# Patient Record
Sex: MALE | Race: WHITE | NOT HISPANIC OR LATINO | Employment: FULL TIME | URBAN - METROPOLITAN AREA
[De-identification: names, ages, dates, MRNs, and addresses within clinical notes are randomized per-mention and may not be internally consistent; named-entity substitution may affect disease eponyms.]

---

## 2018-04-02 ENCOUNTER — ANESTHESIA EVENT (OUTPATIENT)
Dept: GASTROENTEROLOGY | Facility: AMBULARY SURGERY CENTER | Age: 58
End: 2018-04-02
Payer: COMMERCIAL

## 2018-04-02 RX ORDER — ASCORBIC ACID 500 MG
500 TABLET ORAL DAILY
COMMUNITY

## 2018-04-02 RX ORDER — INDOMETHACIN 25 MG/1
25 CAPSULE ORAL 2 TIMES DAILY PRN
COMMUNITY

## 2018-04-02 RX ORDER — ALLOPURINOL 100 MG/1
200 TABLET ORAL EVERY MORNING
COMMUNITY

## 2018-04-02 RX ORDER — LISINOPRIL 20 MG/1
20 TABLET ORAL 2 TIMES DAILY
COMMUNITY

## 2018-04-02 RX ORDER — DIPHENOXYLATE HYDROCHLORIDE AND ATROPINE SULFATE 2.5; .025 MG/1; MG/1
1 TABLET ORAL DAILY
COMMUNITY

## 2018-04-02 RX ORDER — POTASSIUM CHLORIDE 1.5 G/1.77G
20 POWDER, FOR SOLUTION ORAL 2 TIMES DAILY
COMMUNITY

## 2018-04-02 RX ORDER — METAXALONE 800 MG/1
800 TABLET ORAL 3 TIMES DAILY
Status: ON HOLD | COMMUNITY
End: 2018-04-03

## 2018-04-02 NOTE — PRE-PROCEDURE INSTRUCTIONS
Pre-Surgery Instructions:   Medication Instructions    allopurinol (ZYLOPRIM) 100 mg tablet Patient was instructed by Physician and understands   ascorbic acid (VITAMIN C) 500 mg tablet Patient was instructed by Physician and understands   indomethacin (INDOCIN) 25 mg capsule Patient was instructed by Physician and understands   lisinopril (ZESTRIL) 20 mg tablet Patient was instructed by Physician and understands   Lysine 500 MG CAPS Patient was instructed by Physician and understands   metaxalone (SKELAXIN) 800 mg tablet Patient was instructed by Physician and understands   multivitamin [facility]) TABS Patient was instructed by Physician and understands   potassium chloride (KLOR-CON) 20 mEq packet Patient was instructed by Physician and understands   vitamin E 100 UNIT capsule Patient was instructed by Physician and understands

## 2018-04-03 ENCOUNTER — HOSPITAL ENCOUNTER (OUTPATIENT)
Facility: AMBULARY SURGERY CENTER | Age: 58
Setting detail: OUTPATIENT SURGERY
Discharge: HOME | End: 2018-04-03
Attending: INTERNAL MEDICINE | Admitting: INTERNAL MEDICINE
Payer: COMMERCIAL

## 2018-04-03 ENCOUNTER — ANESTHESIA (OUTPATIENT)
Dept: GASTROENTEROLOGY | Facility: AMBULARY SURGERY CENTER | Age: 58
End: 2018-04-03
Payer: COMMERCIAL

## 2018-04-03 VITALS
HEIGHT: 74 IN | SYSTOLIC BLOOD PRESSURE: 114 MMHG | TEMPERATURE: 98.5 F | OXYGEN SATURATION: 99 % | RESPIRATION RATE: 18 BRPM | DIASTOLIC BLOOD PRESSURE: 55 MMHG | BODY MASS INDEX: 37.73 KG/M2 | HEART RATE: 55 BPM | WEIGHT: 294 LBS

## 2018-04-03 DIAGNOSIS — Z86.010 HISTORY OF COLONIC POLYPS: ICD-10-CM

## 2018-04-03 PROCEDURE — 88305 TISSUE EXAM BY PATHOLOGIST: CPT | Performed by: PATHOLOGY

## 2018-04-03 RX ORDER — PROPOFOL 10 MG/ML
INJECTION, EMULSION INTRAVENOUS CONTINUOUS PRN
Status: DISCONTINUED | OUTPATIENT
Start: 2018-04-03 | End: 2018-04-03 | Stop reason: SURG

## 2018-04-03 RX ORDER — SODIUM CHLORIDE 9 MG/ML
125 INJECTION, SOLUTION INTRAVENOUS CONTINUOUS
Status: DISCONTINUED | OUTPATIENT
Start: 2018-04-03 | End: 2018-04-03 | Stop reason: HOSPADM

## 2018-04-03 RX ORDER — PROPOFOL 10 MG/ML
INJECTION, EMULSION INTRAVENOUS AS NEEDED
Status: DISCONTINUED | OUTPATIENT
Start: 2018-04-03 | End: 2018-04-03 | Stop reason: SURG

## 2018-04-03 RX ADMIN — SODIUM CHLORIDE: 0.9 INJECTION, SOLUTION INTRAVENOUS at 09:57

## 2018-04-03 RX ADMIN — PROPOFOL 80 MG: 10 INJECTION, EMULSION INTRAVENOUS at 10:09

## 2018-04-03 RX ADMIN — PROPOFOL 100 MCG/KG/MIN: 10 INJECTION, EMULSION INTRAVENOUS at 10:09

## 2018-04-03 RX ADMIN — PROPOFOL 50 MG: 10 INJECTION, EMULSION INTRAVENOUS at 10:10

## 2018-04-03 RX ADMIN — SODIUM CHLORIDE 125 ML/HR: 0.9 INJECTION, SOLUTION INTRAVENOUS at 08:43

## 2018-04-03 NOTE — DISCHARGE INSTRUCTIONS
I have removed four polyps  They all appear benign  Next colonoscopy in 3-5 years  Eat high-fiber diet  High Fiber Diet   WHAT YOU NEED TO KNOW:   A high-fiber diet includes foods that have a high amount of fiber  Fiber is the part of fruits, vegetables, and grains that is not broken down by your body  Fiber keeps your bowel movements regular  Fiber can also help lower your cholesterol level, control blood sugar in people with diabetes, and relieve constipation  Fiber can also help you control your weight because it helps you feel full faster  Most adults should eat 25 to 35 grams of fiber each day  Talk to your dietitian or healthcare provider about the amount of fiber you need  DISCHARGE INSTRUCTIONS:   Good sources of fiber:   · Foods with at least 4 grams of fiber per serving:      ¨ ? to ½ cup of high-fiber cereal (check the nutrition label on the box)    ¨ ½ cup of blackberries or raspberries    ¨ 4 dried prunes    ¨ 1 cooked artichoke    ¨ ½ cup of cooked legumes, such as lentils, or red, kidney, and chan beans    · Foods with 1 to 3 grams of fiber per serving:      ¨ 1 slice of whole-wheat, pumpernickel, or rye bread    ¨ ½ cup of cooked brown rice    ¨ 4 whole-wheat crackers    ¨ 1 cup of oatmeal    ¨ ½ cup of cereal with 1 to 3 grams of fiber per serving (check the nutrition label on the box)    ¨ 1 small piece of fruit, such as an apple, banana, pear, kiwi, or orange    ¨ 3 dates    ¨ ½ cup of canned apricots, fruit cocktail, peaches, or pears    ¨ ½ cup of raw or cooked vegetables, such as carrots, cauliflower, cabbage, spinach, squash, or corn  Ways that you can increase fiber in your diet:   · Choose brown or wild rice instead of white rice  · Use whole wheat flour in recipes instead of white or all-purpose flour  · Add beans and peas to casseroles or soups  · Choose fresh fruit and vegetables with peels or skins on instead of juices    Other diet guidelines to follow:   · Add fiber to your diet slowly  You may have abdominal discomfort, bloating, and gas if you add fiber to your diet too quickly  · Drink plenty of liquids as you add fiber to your diet  You may have nausea or develop constipation if you do not drink enough water  Ask how much liquid to drink each day and which liquids are best for you  © 2017 [employer] [employer] Information is for End User's use only and may not be sold, redistributed or otherwise used for commercial purposes  All illustrations and images included in CareNotes® are the copyrighted property of [employer]  or [employer] Peña  The above information is an  only  It is not intended as medical advice for individual conditions or treatments  Talk to your doctor, nurse or pharmacist before following any medical regimen to see if it is safe and effective for you  Moderate Sedation   WHAT YOU NEED TO KNOW:   Moderate sedation, or conscious sedation, is medicine used during procedures to help you feel relaxed and calm  You will be awake and able to follow directions without anxiety or pain  You will remember little to none of the procedure  You may feel tired, weak, or unsteady on your feet after you get sedation  You may also have trouble concentrating or short-term memory loss  These symptoms should go away in 24 hours or less  DISCHARGE INSTRUCTIONS:   Call 911 or have someone else call for any of the following:   · You have sudden trouble breathing  · You cannot be woken  Seek care immediately if:   · You have a severe headache or dizziness  · Your heart is beating faster than usual   Contact your healthcare provider if:   · You have a fever  · You have nausea or are vomiting for more than 8 hours after the procedure  · Your skin is itchy, swollen, or you have a rash  · You have questions or concerns about your condition or care  Self-care:   · Have someone stay with you for 24 hours    This person can drive you to errands and help you do things around the house  This person can also watch for problems  · Rest and do quiet activities for 24 hours  Do not exercise, ride a bike, or play sports  Stand up slowly to prevent dizziness and falls  Take short walks around the house with another person  Slowly return to your usual activities the next day  · Do not drive or use dangerous machines or tools for 24 hours  You may injure yourself or others  Examples include a lawnmower, saw, or drill  Do not return to work for 24 hours if you use dangerous machines or tools for work  · Do not make important decisions for 24 hours  For example, do not sign important papers or invest money  · Drink liquids as directed  Liquids help flush the sedation medicine out of your body  Ask how much liquid to drink each day and which liquids are best for you  · Eat small, frequent meals to prevent nausea and vomiting  Start with clear liquids such as juice or broth  If you do not vomit after clear liquids, you can eat your usual foods  · Do not drink alcohol or take medicines that make you drowsy  This includes medicines that help you sleep and anxiety medicines  Ask your healthcare provider if it is safe for you to take pain medicine  Follow up with your healthcare provider as directed:  Write down your questions so you remember to ask them during your visits  © 2017 [employer] Information is for End User's use only and may not be sold, redistributed or otherwise used for commercial purposes  All illustrations and images included in CareNotes® are the copyrighted property of [employer]  or [employer] Peña  The above information is an  only  It is not intended as medical advice for individual conditions or treatments  Talk to your doctor, nurse or pharmacist before following any medical regimen to see if it is safe and effective for you

## 2018-04-03 NOTE — ANESTHESIA PREPROCEDURE EVALUATION
Review of Systems/Medical History          Cardiovascular  Hypertension ,    Pulmonary       GI/Hepatic    Bowel prep       Kidney stones,        Endo/Other    Obesity (BMI 40)    GYN       Hematology   Musculoskeletal  Gout,        Neurology   Psychology                Anesthesia Plan  ASA Score- 3     Anesthesia Type- IV sedation with anesthesia with ASA Monitors  Additional Monitors:   Airway Plan:         Plan Factors- Instructed to abstain from smoking on day of procedure: nonsmoker    Patient smoked on day of surgery: nonsmoker       Induction- intravenous  Postoperative Plan-     Informed Consent- Anesthetic plan and risks discussed with patient

## 2018-04-03 NOTE — H&P
History and Physical   Gastroenterology  Andrea Jeffries 65 y o  male MRN: [id number]  Unit/Bed#: [facility] Encounter: [id number]  04/03/18   9:52 AM      No chief complaint on file  History of Present Illness   HPI:  Andrea Jeffries is a 65 y o  male who presents with history of colon polyp  Last colonoscopy three years ago  Family history of cancers  Patient denies any abdominal pain or discomfort  There is no nausea or vomiting        Historical Information   Past Medical History:   Diagnosis Date    Diverticulosis     Gout     H/O cold sores     Hypertension     Kidney stone      Past Surgical History:   Procedure Laterality Date    COLONOSCOPY      LITHOTRIPSY         Meds/Allergies     Prescriptions Prior to Admission   Medication    allopurinol (ZYLOPRIM) 100 mg tablet    ascorbic acid (VITAMIN C) 500 mg tablet    lisinopril (ZESTRIL) 20 mg tablet    multivitamin (THERAGRAN) TABS    potassium chloride (KLOR-CON) 20 mEq packet    vitamin E 100 UNIT capsule    indomethacin (INDOCIN) 25 mg capsule    Lysine 500 MG CAPS         Current Facility-Administered Medications:     sodium chloride 0 9 % infusion, 125 mL/hr, Intravenous, Continuous, Esther Douglas MD, Last Rate: 125 mL/hr at 04/03/18 0843, 125 mL/hr at 04/03/18 0843    No Known Allergies    Social History   History   Alcohol Use    Yes     Comment: socially     History   Drug Use No     History   Smoking Status    Never Smoker   Smokeless Tobacco    Never Used       Family History:   Family History   Problem Relation Age of Onset    Cancer Mother     No Known Problems Father     No Known Problems Brother     No Known Problems Daughter     No Known Problems Son     No Known Problems Brother     No Known Problems Brother     No Known Problems Daughter     No Known Problems Daughter        Objective     Current Vitals:   Blood Pressure: 142/89 (04/03/18 0836)  Pulse: 74 (04/03/18 0836)  Temperature: 98 5 °F (36 9 °C) (04/03/18 0836)  Temp Source: Tympanic (04/03/18 0836)  Respirations: 18 (04/03/18 0836)  Height: 6' 2" (188 cm) (04/02/18 0827)  Weight - Scale: 133 kg (294 lb) (04/02/18 0827)  SpO2: 95 % (04/03/18 0836)  No intake or output data in the 24 hours ending 04/03/18 0952    Physical Exam:  General:  Alert and oriented  Eyes:  There is no pallor or jaundice  Pulm:  Clear to auscultation and percussion  CV:  There is normal S1 and S2  There is no murmur or gallop  Abdomen:  Soft and nontender  There is no mass felt  Liver is felt  Liver is not enlarged  Extremities:      ASSESSMENT:  Joycelyn Holder is a 65 y o  male who presents with history of colon polyps and family history of cancers  Rosella Goldmann PLAN:  Colonoscopy for screening and surveillance        Napoleon Velasquez MD

## 2018-04-03 NOTE — OP NOTE
Procedure: Colonoscopy snare polypectomy and biopsy  Indications: History of colon polyp  Last colonoscopy three years ago  Providers: Quinten Tijerina    Referring MD: No ref  provider found   Medications:  See chart      Consent: Patient was explained the procedure of colonoscopy  Indication of the procedure and associated complications including bleeding, perforation and medication side effect was explained to the patient  Less than 100% sensitivity was also explained  Patient admitted understanding above  Vital signs stable  HEENT examination is unremarkable  Chest is clear bilaterally  Cardiovascular system examination reveals normal S1 and S2  There is no murmur or  Gallop  Abdomen is soft  There is no mass or guarding  Abdomen is nontender  Preprocedure diagnosis:  History of adenomatous colon polyp    Patient was put on left lateral position  After IV anesthesia was administered the procedure was started with insertion of the scope into the rectum  The scope was gradually progressed via sigmoid colon, descending colon, splenic flexure, transverse colon, hepatic flexure, ascending colon into the cecum  All the landmarks were ascertained  Scope was then gradually pulled out and entire colon was examined appropriately  Polyp removed from the cecum using a biopsy forceps  A small sessile polyp was removed from the distal ascending colon using a cold snare  Two small polyp was also removed from the mid sigmoid colon using a biopsy forceps  Postprocedure diagnosis:  Colon polyp    Prep was adequate  Biopsies taken:  X3    Blood loss:  Minimal      Findings    1  Colon polyp removed from the cecum, ascending colon and sigmoid colon  2   Small hemorrhoid  Impression/Recommendations;    1  Check biopsy  2   Repeat colonoscopy in 3-5 years            Quinten Tijerina MD

## 2021-06-06 ENCOUNTER — APPOINTMENT (EMERGENCY)
Dept: CT IMAGING | Facility: HOSPITAL | Age: 61
End: 2021-06-06
Payer: COMMERCIAL

## 2021-06-06 ENCOUNTER — HOSPITAL ENCOUNTER (EMERGENCY)
Facility: HOSPITAL | Age: 61
Discharge: HOME | End: 2021-06-07
Attending: EMERGENCY MEDICINE | Admitting: EMERGENCY MEDICINE
Payer: COMMERCIAL

## 2021-06-06 DIAGNOSIS — N39.0 UTI (URINARY TRACT INFECTION): Primary | ICD-10-CM

## 2021-06-06 LAB
BASOPHILS # BLD AUTO: 0.03 THOUSANDS/ΜL (ref 0–0.1)
BASOPHILS NFR BLD AUTO: 0 % (ref 0–1)
BILIRUB UR QL STRIP: NEGATIVE
CLARITY UR: ABNORMAL
COLOR UR: ABNORMAL
EOSINOPHIL # BLD AUTO: 0.16 THOUSAND/ΜL (ref 0–0.61)
EOSINOPHIL NFR BLD AUTO: 2 % (ref 0–6)
ERYTHROCYTE [DISTWIDTH] IN BLOOD BY AUTOMATED COUNT: 13.3 % (ref 11.6–15.1)
GLUCOSE UR STRIP-MCNC: NEGATIVE MG/DL
HCT VFR BLD AUTO: 45.1 % (ref 36.5–49.3)
HGB BLD-MCNC: 14.8 G/DL (ref 12–17)
HGB UR QL STRIP.AUTO: ABNORMAL
IMM GRANULOCYTES # BLD AUTO: 0.02 THOUSAND/UL (ref 0–0.2)
IMM GRANULOCYTES NFR BLD AUTO: 0 % (ref 0–2)
KETONES UR STRIP-MCNC: ABNORMAL MG/DL
LEUKOCYTE ESTERASE UR QL STRIP: ABNORMAL
LYMPHOCYTES # BLD AUTO: 2.52 THOUSANDS/ΜL (ref 0.6–4.47)
LYMPHOCYTES NFR BLD AUTO: 30 % (ref 14–44)
MCH RBC QN AUTO: 28.1 PG (ref 26.8–34.3)
MCHC RBC AUTO-ENTMCNC: 32.8 G/DL (ref 31.4–37.4)
MCV RBC AUTO: 86 FL (ref 82–98)
MONOCYTES # BLD AUTO: 1.01 THOUSAND/ΜL (ref 0.17–1.22)
MONOCYTES NFR BLD AUTO: 12 % (ref 4–12)
NEUTROPHILS # BLD AUTO: 4.56 THOUSANDS/ΜL (ref 1.85–7.62)
NEUTS SEG NFR BLD AUTO: 56 % (ref 43–75)
NITRITE UR QL STRIP: POSITIVE
PH UR STRIP.AUTO: 7 [PH]
PLATELET # BLD AUTO: 220 THOUSANDS/UL (ref 149–390)
PMV BLD AUTO: 10.7 FL (ref 8.9–12.7)
PROT UR STRIP-MCNC: ABNORMAL MG/DL
RBC # BLD AUTO: 5.26 MILLION/UL (ref 3.88–5.62)
SP GR UR STRIP.AUTO: 1.02 (ref 1–1.03)
UROBILINOGEN UR QL STRIP.AUTO: 1 E.U./DL
WBC # BLD AUTO: 8.3 THOUSAND/UL (ref 4.31–10.16)

## 2021-06-06 PROCEDURE — 83690 ASSAY OF LIPASE: CPT | Performed by: EMERGENCY MEDICINE

## 2021-06-06 PROCEDURE — 80053 COMPREHEN METABOLIC PANEL: CPT | Performed by: EMERGENCY MEDICINE

## 2021-06-06 PROCEDURE — 36415 COLL VENOUS BLD VENIPUNCTURE: CPT | Performed by: EMERGENCY MEDICINE

## 2021-06-06 PROCEDURE — 99284 EMERGENCY DEPT VISIT MOD MDM: CPT

## 2021-06-06 PROCEDURE — 99285 EMERGENCY DEPT VISIT HI MDM: CPT | Performed by: EMERGENCY MEDICINE

## 2021-06-06 PROCEDURE — 96374 THER/PROPH/DIAG INJ IV PUSH: CPT

## 2021-06-06 PROCEDURE — 81001 URINALYSIS AUTO W/SCOPE: CPT | Performed by: EMERGENCY MEDICINE

## 2021-06-06 PROCEDURE — 85025 COMPLETE CBC W/AUTO DIFF WBC: CPT | Performed by: EMERGENCY MEDICINE

## 2021-06-06 PROCEDURE — 74176 CT ABD & PELVIS W/O CONTRAST: CPT

## 2021-06-06 RX ORDER — MORPHINE SULFATE 4 MG/ML
4 INJECTION, SOLUTION INTRAMUSCULAR; INTRAVENOUS ONCE
Status: COMPLETED | OUTPATIENT
Start: 2021-06-06 | End: 2021-06-06

## 2021-06-06 RX ORDER — METOPROLOL SUCCINATE 25 MG/1
25 TABLET, EXTENDED RELEASE ORAL DAILY
COMMUNITY

## 2021-06-06 RX ADMIN — MORPHINE SULFATE 4 MG: 4 INJECTION INTRAVENOUS at 23:33

## 2021-06-07 VITALS
TEMPERATURE: 98.2 F | HEART RATE: 65 BPM | HEIGHT: 75 IN | RESPIRATION RATE: 16 BRPM | DIASTOLIC BLOOD PRESSURE: 58 MMHG | WEIGHT: 315 LBS | BODY MASS INDEX: 39.17 KG/M2 | OXYGEN SATURATION: 96 % | SYSTOLIC BLOOD PRESSURE: 119 MMHG

## 2021-06-07 LAB
ALBUMIN SERPL BCP-MCNC: 4.3 G/DL (ref 3.4–4.8)
ALP SERPL-CCNC: 62.9 U/L (ref 10–129)
ALT SERPL W P-5'-P-CCNC: 26 U/L (ref 5–63)
ANION GAP SERPL CALCULATED.3IONS-SCNC: 8 MMOL/L (ref 4–13)
AST SERPL W P-5'-P-CCNC: 20 U/L (ref 15–41)
BACTERIA UR QL AUTO: ABNORMAL /HPF
BILIRUB SERPL-MCNC: 0.59 MG/DL (ref 0.3–1.2)
BUN SERPL-MCNC: 23 MG/DL (ref 6–20)
CALCIUM SERPL-MCNC: 9.3 MG/DL (ref 8.4–10.2)
CHLORIDE SERPL-SCNC: 105 MMOL/L (ref 96–108)
CO2 SERPL-SCNC: 29 MMOL/L (ref 22–33)
CREAT SERPL-MCNC: 1.1 MG/DL (ref 0.5–1.2)
GFR SERPL CREATININE-BSD FRML MDRD: 73 ML/MIN/1.73SQ M
GLUCOSE SERPL-MCNC: 105 MG/DL (ref 65–140)
LIPASE SERPL-CCNC: 20 U/L (ref 13–60)
NON-SQ EPI CELLS URNS QL MICRO: ABNORMAL /HPF
POTASSIUM SERPL-SCNC: 4.2 MMOL/L (ref 3.5–5)
PROT SERPL-MCNC: 6.9 G/DL (ref 6.4–8.3)
RBC #/AREA URNS AUTO: ABNORMAL /HPF
SODIUM SERPL-SCNC: 142 MMOL/L (ref 133–145)
WBC #/AREA URNS AUTO: ABNORMAL /HPF

## 2021-06-07 PROCEDURE — 96361 HYDRATE IV INFUSION ADD-ON: CPT

## 2021-06-07 RX ORDER — SULFAMETHOXAZOLE AND TRIMETHOPRIM 800; 160 MG/1; MG/1
1 TABLET ORAL 2 TIMES DAILY
Qty: 14 TABLET | Refills: 0 | Status: SHIPPED | OUTPATIENT
Start: 2021-06-07 | End: 2021-06-07 | Stop reason: SDUPTHER

## 2021-06-07 RX ORDER — SULFAMETHOXAZOLE AND TRIMETHOPRIM 800; 160 MG/1; MG/1
1 TABLET ORAL ONCE
Status: COMPLETED | OUTPATIENT
Start: 2021-06-07 | End: 2021-06-07

## 2021-06-07 RX ORDER — SULFAMETHOXAZOLE AND TRIMETHOPRIM 800; 160 MG/1; MG/1
1 TABLET ORAL 2 TIMES DAILY
Qty: 14 TABLET | Refills: 0 | Status: SHIPPED | OUTPATIENT
Start: 2021-06-07 | End: 2021-06-14

## 2021-06-07 RX ADMIN — SULFAMETHOXAZOLE AND TRIMETHOPRIM 1 TABLET: 800; 160 TABLET ORAL at 01:47

## 2021-06-07 RX ADMIN — SODIUM CHLORIDE 1000 ML: 0.9 INJECTION, SOLUTION INTRAVENOUS at 00:57

## 2021-06-07 NOTE — ED TRIAGE NOTES
Pt presents to the ED from home with complaint of right flank pain which radiates into right groin which started at 1600 today; history of kidney stones of unknown laterality; denies nausea, vomiting, fever and/or diarrhea; arrives ambulatory able to answer all questions appropriately

## 2021-06-07 NOTE — ED PROVIDER NOTES
History  Chief Complaint   Patient presents with    Flank Pain     Pt presents to the ED from home with complaint of right flank pain which radiates into right groin which started at 1600 today; history of kidney stones of unknown laterality; denies nausea, vomiting, fever and/or diarrhea; arrives ambulatory able to answer all questions appropriately     This is a 72-year-old male presents the emergency department complaining of right flank pain  Patient states pain began approximately 4:00 a m  This afternoon  Patient states the pain radiates from the right flank down to the  lower abdomen  He states nothing makes the pain better or worse  He states the pain Is mild an annoying  He denies fevers or chills  He denies chest pain or trouble breathing  He does have urgency and frequency  He states this feels like his prior kidney stone  Prior to Admission Medications   Prescriptions Last Dose Informant Patient Reported? Taking?    Lysine 500 MG CAPS Not Taking at Unknown time  Yes No   Sig: Take by mouth   allopurinol (ZYLOPRIM) 100 mg tablet More than a month at Unknown time  Yes No   Sig: Take 200 mg by mouth every morning   ascorbic acid (VITAMIN C) 500 mg tablet 6/6/2021 at Unknown time  Yes Yes   Sig: Take 500 mg by mouth daily   indomethacin (INDOCIN) 25 mg capsule More than a month at Unknown time  Yes No   Sig: Take 25 mg by mouth 2 (two) times a day as needed for mild pain   lisinopril (ZESTRIL) 20 mg tablet 6/6/2021 at Unknown time  Yes Yes   Sig: Take 20 mg by mouth 2 (two) times a day   metoprolol succinate (TOPROL-XL) 25 mg 24 hr tablet 6/6/2021 at Unknown time  Yes Yes   Sig: Take 25 mg by mouth daily   multivitamin (THERAGRAN) TABS 6/6/2021 at Unknown time  Yes Yes   Sig: Take 1 tablet by mouth daily   potassium chloride (KLOR-CON) 20 mEq packet Not Taking at Unknown time  Yes No   Sig: Take 20 mEq by mouth 2 (two) times a day   vitamin E 100 UNIT capsule Not Taking at Unknown time  Yes No   Sig: Take 100 Units by mouth daily      Facility-Administered Medications: None       Past Medical History:   Diagnosis Date    Diverticulosis     Gout     H/O cold sores     Hypertension     Kidney stone        Past Surgical History:   Procedure Laterality Date    CARDIAC CATHETERIZATION  09/2018    COLONOSCOPY      CORONARY STENT PLACEMENT  09/2018    CORONARY STENT PLACEMENT      LITHOTRIPSY      [place] COLSC FLX W/RMVL OF TUMOR POLYP LESION SNARE TQ N/A 4/3/2018    Procedure: COLONOSCOPY biopsy and snare polypectomy;  Surgeon: Bryan Tello MD;  Location: [facility] GI LAB; Service: Gastroenterology       Family History   Problem Relation Age of Onset    Cancer Mother     Skin cancer Mother     Melanoma Mother     Heart attack Father     Hypertension Father     No Known Problems Brother     No Known Problems Daughter     No Known Problems Son     No Known Problems Brother     No Known Problems Brother     No Known Problems Daughter     No Known Problems Daughter      I have reviewed and agree with the history as documented  E-Cigarette/Vaping     E-Cigarette/Vaping Substances     Social History     Tobacco Use    Smoking status: Current Every Day Smoker     Types: Cigars    Smokeless tobacco: Never Used    Tobacco comment: Former smoker x 5 years; quit 1993 - As per [place]    Substance Use Topics    Alcohol use: Yes     Comment: socially    Drug use: No       Review of Systems   All other systems reviewed and are negative        Physical Exam  Physical Exam  Constitutional:  Vital signs reviewed, patient appears nontoxic, no acute distress  Eyes: Pupils equal round reactive to light and accommodation, extraocular muscles intact  HEENT: trachea midline, no JVD, moist mucous membranes  Respiratory: lung sounds clear throughout, no rhonchi, no rales  Cardiovascular: regular rate rhythm, no murmurs or rubs  Abdomen: soft, suprapubic tenderness, nondistended, no rebound or guarding  Back:  Mild right-sided CVA tenderness, normal inspection  Extremities: no edema, pulses equal in all 4 extremities  Neuro: awake, alert, oriented, no focal weakness  Skin: warm, dry, intact, no rashes noted    Vital Signs  ED Triage Vitals [06/06/21 2253]   Temperature Pulse Respirations Blood Pressure SpO2   (!) 97 4 °F (36 3 °C) 71 16 139/91 97 %      Temp Source Heart Rate Source Patient Position - Orthostatic VS BP Location FiO2 (%)   Oral Monitor Sitting Left arm --      Pain Score       5           Vitals:    06/06/21 2253 06/07/21 0104   BP: 139/91 119/58   Pulse: 71 65   Patient Position - Orthostatic VS: Sitting Lying         Visual Acuity      ED Medications  Medications   morphine (PF) 4 mg/mL injection 4 mg (4 mg Intravenous Given 6/6/21 2333)   sodium chloride 0 9 % bolus 1,000 mL (0 mL Intravenous Stopped 6/7/21 0205)   sulfamethoxazole-trimethoprim (BACTRIM DS) 800-160 mg per tablet 1 tablet (1 tablet Oral Given 6/7/21 0147)       Diagnostic Studies  Results Reviewed     Procedure Component Value Units Date/Time    Urine Microscopic [[id number]]  (Abnormal) Collected: 06/06/21 2343    Lab Status: Final result Specimen: Urine, Clean Catch Updated: 06/07/21 0015     RBC, UA       Field obscured, unable to enumerate     /hpf     WBC, UA       Field obscured, unable to enumerate     /hpf     Epithelial Cells       Field obscured, unable to enumerate     /hpf     Bacteria, UA       Field obscured, unable to enumerate     /hpf    Narrative:      Due to the large amount of red cells in specimen no quantitation can be performed    SLW    CMP [[id number]]  (Abnormal) Collected: 06/06/21 2329    Lab Status: Final result Specimen: Blood from Arm, Left Updated: 06/07/21 0002     Sodium 142 mmol/L      Potassium 4 2 mmol/L      Chloride 105 mmol/L      CO2 29 mmol/L      ANION GAP 8 mmol/L      BUN 23 mg/dL      Creatinine 1 10 mg/dL      Glucose 105 mg/dL      Calcium 9 3 mg/dL      AST 20 U/L      ALT 26 U/L      Alkaline Phosphatase 62 9 U/L      Total Protein 6 9 g/dL      Albumin 4 3 g/dL      Total Bilirubin 0 59 mg/dL      eGFR 73 ml/min/1 73sq m     Narrative:      National Kidney Disease Foundation guidelines for Chronic Kidney Disease (CKD):     Stage 1 with normal or high GFR (GFR > 90 mL/min/1 73 square meters)    Stage 2 Mild CKD (GFR = 60-89 mL/min/1 73 square meters)    Stage 3A Moderate CKD (GFR = 45-59 mL/min/1 73 square meters)    Stage 3B Moderate CKD (GFR = 30-44 mL/min/1 73 square meters)    Stage 4 Severe CKD (GFR = 15-29 mL/min/1 73 square meters)    Stage 5 End Stage CKD (GFR <15 mL/min/1 73 square meters)  Note: GFR calculation is accurate only with a steady state creatinine    Lipase [[id number]]  (Normal) Collected: 06/06/21 2329    Lab Status: Final result Specimen: Blood from Arm, Left Updated: 06/07/21 0002     Lipase 20 u/L     UA (URINE) with reflex to Scope [[id number]]  (Abnormal) Collected: 06/06/21 2343    Lab Status: Final result Specimen: Urine, Clean Catch Updated: 06/06/21 2354     Color, UA Red     Clarity, UA Cloudy     Specific Gravity, UA 1 025     pH, UA 7 0     Leukocytes, UA 1+     Nitrite, UA Positive     Protein, UA 2+ mg/dl      Glucose, UA Negative mg/dl      Ketones, UA Trace mg/dl      Urobilinogen, UA 1 0 E U /dl      Bilirubin, UA Negative     Blood, UA 3+    CBC and differential [[id number]]  (Normal) Collected: 06/06/21 2329    Lab Status: Final result Specimen: Blood from Arm, Left Updated: 06/06/21 2344     WBC 8 30 Thousand/uL      RBC 5 26 Million/uL      Hemoglobin 14 8 g/dL      Hematocrit 45 1 %      MCV 86 fL      MCH 28 1 pg      MCHC 32 8 g/dL      RDW 13 3 %      MPV 10 7 fL      Platelets 898 Thousands/uL      Neutrophils Relative 56 %      Immat GRANS % 0 %      Lymphocytes Relative 30 %      Monocytes Relative 12 %      Eosinophils Relative 2 %      Basophils Relative 0 %      Neutrophils Absolute 4 56 Thousands/µL      Immature Grans Absolute 0 02 Thousand/uL      Lymphocytes Absolute 2 52 Thousands/µL      Monocytes Absolute 1 01 Thousand/µL      Eosinophils Absolute 0 16 Thousand/µL      Basophils Absolute 0 03 Thousands/µL                  CT renal stone study abdomen pelvis without contrast   Final Result by Carolyn Thorpe DO (06/07 0117)      No nephrolithiasis or hydronephrosis is seen  Partially distended bladder  Mild/moderate circumferential bladder wall thickening noted, probably exaggerated by underdistention  Superimposed cystitis is considered in the appropriate clinical setting  Correlation with the patient's symptoms,    laboratory values, and urinalysis recommended  Cholelithiasis without discrete evidence of acute cholecystitis, and other findings as above  Workstation performed: [id number]                    Procedures  Procedures         ED Course                             SBIRT 20yo+      Most Recent Value   SBIRT (23 yo +)   In order to provide better care to our patients, we are screening all of our patients for alcohol and drug use  Would it be okay to ask you these screening questions? No Filed at: 06/07/2021 0153   Initial Alcohol Screen: US AUDIT-C    1  How often do you have a drink containing alcohol? 4 Filed at: 06/07/2021 0153   2  How many drinks containing alcohol do you have on a typical day you are drinking? 0 Filed at: 06/07/2021 0153   3a  Male UNDER 65: How often do you have five or more drinks on one occasion? 0 Filed at: 06/07/2021 0153   3b  FEMALE Any Age, or MALE 65+: How often do you have 4 or more drinks on one occassion? 0 Filed at: 06/07/2021 0153   Audit-C Score  4 Filed at: 06/07/2021 0153   RICKY: How many times in the past year have you    Used an illegal drug or used a prescription medication for non-medical reasons?   Never Filed at: 06/07/2021 0153                    MDM  Number of Diagnoses or Management Options  UTI (urinary tract infection):   Diagnosis management comments: This is a 60-year-old male presented to the emergency department complaining of right-sided flank pain and lower abdominal discomfort  I considered nephrolithiasis, UTI, pyelonephritis  These and other diagnoses were considered  The patient was well-appearing on exam   He was not tachycardic or febrile  The patient went to the bathroom just prior to the CT scan and had blood come out  He does state he had significant relief after pain medication  Patient had a UA that was significant for nitrate positive urine  He had a CT scan that showed no urinary calculi  The patient was started on antibiotics for a urinary tract infection and discharged follow-up to his primary care physician  Amount and/or Complexity of Data Reviewed  Clinical lab tests: reviewed and ordered  Tests in the radiology section of CPT®: reviewed and ordered        Disposition  Final diagnoses:   UTI (urinary tract infection)     Time reflects when diagnosis was documented in both MDM as applicable and the Disposition within this note     Time User Action Codes Description Comment    6/7/2021  1:32 AM Joyce Atkins [N39 0] UTI (urinary tract infection)       ED Disposition     ED Disposition Condition Date/Time Comment    Discharge Stable Mon Jun 7, 2021 [address] discharge to home/self care              Follow-up Information     Follow up With Specialties Details Why Contact Info Additional [id number]  Emergency Department Emergency Medicine  If symptoms worsen [address] [facility],[address] [id number]  [facility] Emergency Department, [address]          Discharge Medication List as of 6/7/2021  1:33 AM      START taking these medications    Details   sulfamethoxazole-trimethoprim (BACTRIM DS) 800-160 mg per tablet Take 1 tablet by mouth 2 (two) times a day for 7 days smx-tmp DS (BACTRIM) 800-160 mg tabs (1tab q12 D10), Starting Mon 6/7/2021, Until Mon 6/14/2021, Normal         CONTINUE these medications which have NOT CHANGED    Details   ascorbic acid (VITAMIN C) 500 mg tablet Take 500 mg by mouth daily, Historical Med      lisinopril (ZESTRIL) 20 mg tablet Take 20 mg by mouth 2 (two) times a day, Historical Med      metoprolol succinate (TOPROL-XL) 25 mg 24 hr tablet Take 25 mg by mouth daily, Historical Med      multivitamin (THERAGRAN) TABS Take 1 tablet by mouth daily, Historical Med      allopurinol (ZYLOPRIM) 100 mg tablet Take 200 mg by mouth every morning, Historical Med      indomethacin (INDOCIN) 25 mg capsule Take 25 mg by mouth 2 (two) times a day as needed for mild pain, Historical Med      Lysine 500 MG CAPS Take by mouth, Historical Med      potassium chloride (KLOR-CON) 20 mEq packet Take 20 mEq by mouth 2 (two) times a day, Historical Med      vitamin E 100 UNIT capsule Take 100 Units by mouth daily, Historical Med           No discharge procedures on file      PDMP Review     None          ED Provider  Electronically Signed by           Codie Elder DO  06/07/21 4474

## 2021-12-12 ENCOUNTER — OFFICE VISIT (OUTPATIENT)
Dept: URGENT CARE | Facility: MEDICAL CENTER | Age: 61
End: 2021-12-12
Payer: COMMERCIAL

## 2021-12-12 VITALS
DIASTOLIC BLOOD PRESSURE: 88 MMHG | BODY MASS INDEX: 39.17 KG/M2 | RESPIRATION RATE: 18 BRPM | TEMPERATURE: 97 F | HEART RATE: 76 BPM | WEIGHT: 315 LBS | OXYGEN SATURATION: 94 % | SYSTOLIC BLOOD PRESSURE: 148 MMHG | HEIGHT: 75 IN

## 2021-12-12 DIAGNOSIS — Z48.02 ENCOUNTER FOR REMOVAL OF SUTURES: Primary | ICD-10-CM

## 2021-12-12 PROCEDURE — 99213 OFFICE O/P EST LOW 20 MIN: CPT | Performed by: PHYSICIAN ASSISTANT

## 2022-05-31 ENCOUNTER — TELEPHONE (OUTPATIENT)
Dept: GASTROENTEROLOGY | Facility: AMBULARY SURGERY CENTER | Age: 62
End: 2022-05-31

## 2022-05-31 NOTE — TELEPHONE ENCOUNTER
Patients GI provider:  Dr Lizandro Stout    Number to return call: [phone]    Reason for call: Pt calling requesting for a call back to reschedule repeat colonoscopy    Scheduled procedure/appointment date if applicable: Apt/procedure n/a

## 2022-06-01 ENCOUNTER — TELEPHONE (OUTPATIENT)
Dept: GASTROENTEROLOGY | Facility: CLINIC | Age: 62
End: 2022-06-01

## 2022-06-01 NOTE — TELEPHONE ENCOUNTER
Patients GI provider:  Dr Francesca Garcia    Number to return call: [phone]     Reason for call: Pt calling returning a call to schedule repeat colonoscopy     Scheduled procedure/appointment date if applicable: Apt/procedure n/a

## 2022-06-01 NOTE — TELEPHONE ENCOUNTER
Patient's wife returning Milagro's call to scheduled pt's procedure   She can be reached at [phone]

## 2022-06-01 NOTE — TELEPHONE ENCOUNTER
[facility] Assessment    Name: Liat Escamilla  YOB: 1960  Last Height: 6' 3" (1 905 m)  Last weight: (!) 146 kg (322 lb)  BMI: 40 25 kg/m²  Procedure: Colon  Diagnosis: hx of polyps  Date of procedure: 7/18/22  Prep: miralax w/ mag & dul  Responsible : yes  Phone#: [phone]  Name completing form: Sebastien Abler  Date form completed: 06/01/22      If the patient answers yes to any of these questions, schedule in a hospital  Are you pregnant: No  Do you rely on a wheelchair for mobility: No  Have you been diagnosed with End Stage Renal Disease (ESRD): No  Do you need oxygen during the day: No  Have you had a heart attack or stroke within the past three months: No  Have you had a seizure within the past three months: No  Have you ever been informed by anesthesia that you have a difficult airway: No  Additional Questions  Have you had any cardiac testing or are under the care of a Cardiologist (see cardiac list): Yes (Comment: Obtain Cardiac Clearance)  Cardiac list:   Do you have an implanted cardiac defibrillator: No (Comment:  This patient should be scheduled in the hospital)    Have any bleeding problems, such as anemia or hemophilia (If patient has H&H result below 8, schedule in hospital   H&H must be within 30 days of procedure): No    Had an organ transplant within the past 3 months: No    Do you have any present infections: No  Do you get short of breath when walking a few blocks: Yes  Have you been diagnosed with diabetes: No  Comments (provide cardiac provider information if applicable): Dr Brad Moore

## 2022-06-01 NOTE — TELEPHONE ENCOUNTER
Scheduled date of colonoscopy (as of today): 7/18/22  Physician performing colonoscopy: Dr Gris Cardozo  Location of colonoscopy: [facility]  Bowel prep reviewed with patient: Miralax w/ mag & dul   Instructions reviewed with patient by: ;s  Clearances: Will fax cardiac clearance to Dr Giuliano Tomas one month prior to procedure

## 2022-06-01 NOTE — TELEPHONE ENCOUNTER
I lmom apologizing for missing pt's call and asked him to please call back to schedule his procedure with Dr Linn Carson

## 2022-06-20 NOTE — TELEPHONE ENCOUNTER
Faxed cardiac clearance request to Dr Sherryle Powers [phone]  Will call Dr David Tyson office in one week to make sure that clearance request was received [phone]

## 2022-07-12 ENCOUNTER — TELEPHONE (OUTPATIENT)
Dept: GASTROENTEROLOGY | Facility: CLINIC | Age: 62
End: 2022-07-12

## 2022-07-12 NOTE — TELEPHONE ENCOUNTER
lmom confirming pt's colonoscopy  scheduled on 7/18/22 at [facility] with Dr Alva Boogie  I informed pt that [facility] will be calling him 1-2 days prior with arrival time  I informed pt of clear liquid diet the day before as well as the bowel cleansing preparation  I informed pt that he will need a  the day of the procedure due to being under sedation  I asked pt to please call back if he has any questions and was also informed  to contact his insurance regarding the coverage of his colonoscopy if he has any questions

## 2022-07-18 ENCOUNTER — ANESTHESIA (OUTPATIENT)
Dept: GASTROENTEROLOGY | Facility: AMBULATORY SURGERY CENTER | Age: 62
End: 2022-07-18

## 2022-07-18 ENCOUNTER — ANESTHESIA EVENT (OUTPATIENT)
Dept: GASTROENTEROLOGY | Facility: AMBULATORY SURGERY CENTER | Age: 62
End: 2022-07-18

## 2022-07-18 ENCOUNTER — HOSPITAL ENCOUNTER (OUTPATIENT)
Dept: GASTROENTEROLOGY | Facility: AMBULATORY SURGERY CENTER | Age: 62
Discharge: HOME | End: 2022-07-18
Payer: COMMERCIAL

## 2022-07-18 VITALS
RESPIRATION RATE: 18 BRPM | SYSTOLIC BLOOD PRESSURE: 109 MMHG | DIASTOLIC BLOOD PRESSURE: 71 MMHG | HEART RATE: 52 BPM | BODY MASS INDEX: 36.06 KG/M2 | TEMPERATURE: 98.6 F | OXYGEN SATURATION: 99 % | HEIGHT: 75 IN | WEIGHT: 290 LBS

## 2022-07-18 DIAGNOSIS — Z86.010 HX OF COLONIC POLYPS: ICD-10-CM

## 2022-07-18 PROBLEM — G47.30 SLEEP APNEA: Status: ACTIVE | Noted: 2022-07-18

## 2022-07-18 PROBLEM — I21.9 MI (MYOCARDIAL INFARCTION) (HCC): Status: ACTIVE | Noted: 2022-07-18

## 2022-07-18 PROBLEM — I10 HTN (HYPERTENSION): Status: ACTIVE | Noted: 2022-07-18

## 2022-07-18 PROCEDURE — 45385 COLONOSCOPY W/LESION REMOVAL: CPT | Performed by: INTERNAL MEDICINE

## 2022-07-18 RX ORDER — SODIUM CHLORIDE 9 MG/ML
20 INJECTION, SOLUTION INTRAVENOUS CONTINUOUS
Status: DISCONTINUED | OUTPATIENT
Start: 2022-07-18 | End: 2022-07-22 | Stop reason: HOSPADM

## 2022-07-18 RX ORDER — METOPROLOL TARTRATE 5 MG/5ML
INJECTION INTRAVENOUS AS NEEDED
Status: DISCONTINUED | OUTPATIENT
Start: 2022-07-18 | End: 2022-07-18

## 2022-07-18 RX ORDER — LIDOCAINE HYDROCHLORIDE 20 MG/ML
INJECTION, SOLUTION EPIDURAL; INFILTRATION; INTRACAUDAL; PERINEURAL AS NEEDED
Status: DISCONTINUED | OUTPATIENT
Start: 2022-07-18 | End: 2022-07-18

## 2022-07-18 RX ORDER — PROPOFOL 10 MG/ML
INJECTION, EMULSION INTRAVENOUS AS NEEDED
Status: DISCONTINUED | OUTPATIENT
Start: 2022-07-18 | End: 2022-07-18

## 2022-07-18 RX ORDER — SODIUM CHLORIDE, SODIUM LACTATE, POTASSIUM CHLORIDE, CALCIUM CHLORIDE 600; 310; 30; 20 MG/100ML; MG/100ML; MG/100ML; MG/100ML
INJECTION, SOLUTION INTRAVENOUS CONTINUOUS PRN
Status: DISCONTINUED | OUTPATIENT
Start: 2022-07-18 | End: 2022-07-18

## 2022-07-18 RX ADMIN — PROPOFOL 30 MG: 10 INJECTION, EMULSION INTRAVENOUS at 09:27

## 2022-07-18 RX ADMIN — PROPOFOL 30 MG: 10 INJECTION, EMULSION INTRAVENOUS at 09:41

## 2022-07-18 RX ADMIN — METOPROLOL TARTRATE 2.5 MG: 5 INJECTION INTRAVENOUS at 09:31

## 2022-07-18 RX ADMIN — PROPOFOL 30 MG: 10 INJECTION, EMULSION INTRAVENOUS at 09:35

## 2022-07-18 RX ADMIN — PROPOFOL 30 MG: 10 INJECTION, EMULSION INTRAVENOUS at 09:28

## 2022-07-18 RX ADMIN — PROPOFOL 30 MG: 10 INJECTION, EMULSION INTRAVENOUS at 09:33

## 2022-07-18 RX ADMIN — SODIUM CHLORIDE, SODIUM LACTATE, POTASSIUM CHLORIDE, CALCIUM CHLORIDE: 600; 310; 30; 20 INJECTION, SOLUTION INTRAVENOUS at 09:18

## 2022-07-18 RX ADMIN — METOPROLOL TARTRATE 2.5 MG: 5 INJECTION INTRAVENOUS at 09:25

## 2022-07-18 RX ADMIN — PROPOFOL 20 MG: 10 INJECTION, EMULSION INTRAVENOUS at 09:44

## 2022-07-18 RX ADMIN — PROPOFOL 20 MG: 10 INJECTION, EMULSION INTRAVENOUS at 09:31

## 2022-07-18 RX ADMIN — LIDOCAINE HYDROCHLORIDE 100 MG: 20 INJECTION, SOLUTION EPIDURAL; INFILTRATION; INTRACAUDAL; PERINEURAL at 09:26

## 2022-07-18 RX ADMIN — PROPOFOL 90 MG: 10 INJECTION, EMULSION INTRAVENOUS at 09:26

## 2022-07-18 RX ADMIN — PROPOFOL 30 MG: 10 INJECTION, EMULSION INTRAVENOUS at 09:40

## 2022-07-18 RX ADMIN — PROPOFOL 30 MG: 10 INJECTION, EMULSION INTRAVENOUS at 09:30

## 2022-07-18 RX ADMIN — PROPOFOL 30 MG: 10 INJECTION, EMULSION INTRAVENOUS at 09:39

## 2022-07-18 RX ADMIN — PROPOFOL 30 MG: 10 INJECTION, EMULSION INTRAVENOUS at 09:36

## 2022-07-18 NOTE — ANESTHESIA PREPROCEDURE EVALUATION
Procedure:  COLONOSCOPY    Relevant Problems   CARDIO   (+) HTN (hypertension)   (+) MI (myocardial infarction) ([facility] [address] )      PULMONARY   (+) Sleep apnea     Last cardiology visit 6/2022  LUCÍA in 2018 for NSTEMI  Stable cardiac disease     Physical Exam    Airway    Mallampati score: II  TM Distance: <3 FB  Neck ROM: full     Dental   No notable dental hx     Cardiovascular      Pulmonary      Other Findings        Anesthesia Plan  ASA Score- 3     Anesthesia Type- IV sedation with anesthesia with ASA Monitors  Additional Monitors:   Airway Plan:     Comment: NPO after: 06:00 (sip of water with meds)  Plan Factors-Exercise tolerance (METS): >4 METS  Exercise comment: Swims, does yardwork  Chart reviewed  Patient summary reviewed  Patient is a current smoker (cigars)  Induction- intravenous  Postoperative Plan-     Informed Consent- Anesthetic plan and risks discussed with patient  I personally reviewed this patient with the CRNA  Discussed and agreed on the Anesthesia Plan with the CRNA  Renata Major

## 2022-07-18 NOTE — INTERVAL H&P NOTE
H&P reviewed  After examining the patient I find no changes in the patients condition since the H&P had been written      Vitals:    07/18/22 0852   BP: 140/82   Pulse: 65   Resp: 18   Temp: 98 6 °F (37 °C)   SpO2: 98%

## 2022-07-18 NOTE — DISCHARGE SUMMARY
Discharge Summary - Maximino Babb 65 y o  male MRN: [id number]    Unit/Bed#:  Encounter: [id number]    Admission Date:  07/18/2022    Admitting Diagnosis: Hx of colonic polyps [Z86 010]    HPI:  History of sessile serrated adenoma  Procedures Performed: No orders of the defined types were placed in this encounter  Summary of Hospital Course: Tolerated procedure well    Significant Findings, Care, Treatment and Services Provided:  Colon polyp removed  Complications:  None    Discharge Diagnosis:  See above    Medical Problems             Resolved Problems  Date Reviewed: 7/18/2022   None                 Condition at Discharge: good         Discharge instructions/Information to patient and family:   See after visit summary for information provided to patient and family  Provisions for Follow-Up Care:  See after visit summary for information related to follow-up care and any pertinent home health orders  PCP: No primary care provider on file      Disposition: Home

## 2022-07-18 NOTE — ANESTHESIA POSTPROCEDURE EVALUATION
Post-Op Assessment Note    CV Status:  Stable  Pain Score: 0    Pain management: adequate     Mental Status:  Arousable   Hydration Status:  Euvolemic   PONV Controlled:  Controlled   Airway Patency:  Patent      Post Op Vitals Reviewed: Yes      Staff: Anesthesiologist, CRNA         No complications documented      BP   107/57   Temp      Pulse  55   Resp   12   SpO2   96

## 2022-07-18 NOTE — H&P
History and Physical - [facility]  Srikanth Jacobs 65 y o  male MRN: [id number]                  HPI: Srikanth Jacobs is a 65y o  year old male who presents for screening colonoscopy  He has history of multiple colon polyps including sessile serrated adenoma  Last colonoscopy four years ago  REVIEW OF SYSTEMS: Per the HPI, and otherwise unremarkable  Historical Information   Past Medical History:   Diagnosis Date    Colon polyp     Diverticulosis     Gout     H/O cold sores     Hypertension     Kidney stone     [facility] infarction [facility])     Sleep apnea      Past Surgical History:   Procedure Laterality Date    CARDIAC CATHETERIZATION  09/2018    COLONOSCOPY      CORONARY STENT PLACEMENT  09/2018    CORONARY STENT PLACEMENT      LITHOTRIPSY      [place] COLSC FLX W/RMVL OF TUMOR POLYP LESION SNARE TQ N/A 04/03/2018    Procedure: COLONOSCOPY biopsy and snare polypectomy;  Surgeon: Raquel Mccurdy MD;  Location: [place] [address] GI LAB;   Service: Gastroenterology    SKIN BIOPSY       Social History   Social History     Substance and Sexual Activity   Alcohol Use Not Currently    Comment: socially     Social History     Substance and Sexual Activity   Drug Use No     Social History     Tobacco Use   Smoking Status Light Tobacco Smoker    Years: 5 00    Types: Cigars   Smokeless Tobacco Never Used   Tobacco Comment    Former smoker x 5 years; quit 1993 - As per [place]      Family History   Problem Relation Age of Onset    Cancer Mother     Skin cancer Mother     Melanoma Mother     Heart attack Father     Hypertension Father     No Known Problems Brother     No Known Problems Daughter     No Known Problems Son     No Known Problems Brother     No Known Problems Brother     No Known Problems Daughter     No Known Problems Daughter        Meds/Allergies       Current Outpatient Medications:     ascorbic acid (VITAMIN C) 500 mg tablet    EZETIMIBE PO    lisinopril (ZESTRIL) 20 mg tablet    metoprolol succinate (TOPROL-XL) 25 mg 24 hr tablet    multivitamin (THERAGRAN) TABS    allopurinol (ZYLOPRIM) 100 mg tablet    indomethacin (INDOCIN) 25 mg capsule    Lysine 500 MG CAPS    potassium chloride (KLOR-CON) 20 mEq packet    vitamin E 100 UNIT capsule    No Known Allergies    Objective     /82   Pulse 65   Temp 98 6 °F (37 °C) (Temporal)   Resp 18   Ht 6' 3" (1 905 m)   Wt 132 kg (290 lb)   SpO2 98%   BMI 36 25 kg/m²       PHYSICAL EXAM    Gen: NAD  Head: NCAT  CV: RRR  CHEST: Clear  ABD: soft, NT/ND  EXT: no edema      ASSESSMENT/PLAN:  This is a 65y o  year old male here for colonoscopy, and he is stable and optimized for his procedure

## 2025-06-05 ENCOUNTER — PREP FOR PROCEDURE (OUTPATIENT)
Age: 65
End: 2025-06-05

## 2025-06-05 ENCOUNTER — TELEPHONE (OUTPATIENT)
Dept: GASTROENTEROLOGY | Facility: CLINIC | Age: 65
End: 2025-06-05

## 2025-06-05 DIAGNOSIS — Z86.0100 HISTORY OF COLON POLYPS: Primary | ICD-10-CM

## 2025-06-05 NOTE — TELEPHONE ENCOUNTER
Scheduled date of colonoscopy (as of today):11/3/25    Physician performing colonoscopy:Dr Davidson    Location of colonoscopy:[facility]    Bowel prep reviewed with patient:miralax/dulcolax    Instructions reviewed with patient by:pt requests prep instructions be mailed    Clearances: N/A

## 2025-06-05 NOTE — TELEPHONE ENCOUNTER
Pt is due for a colon 3 years-hx of sessile adenoma polyp, hx of colonic polyps  (former Dr Wright). Called and spoke to pt's wife, Betty whom informed she will call back to schedule since has name of provider at home that Dr Wright wanted her and pt to schedule with.  Will mail recall letter as reminder if do not hear back from pt.

## 2025-06-05 NOTE — TELEPHONE ENCOUNTER
25  Screened by: Sharee Grant    Referring Provider Dr Cassidy    Pre- Screenin' 3 339 BMI 42.4    Has patient been referred for a routine screening Colonoscopy? yes  Is the patient between 45-75 years old? yes      Previous Colonoscopy yes   If yes:    Date: 22    Facility:     Reason:         Does the patient want to see a Gastroenterologist prior to their procedure OR are they having any GI symptoms? no    Has the patient been hospitalized or had abdominal surgery in the past 6 months? no    Does the patient use supplemental oxygen? no    Does the patient take Coumadin, Lovenox, Plavix, Elliquis, Xarelto, or other blood thinning medication? no    Has the patient had a stroke, cardiac event, or stent placed in the past year? no      If patient is between 45yrs - 49yrs, please advise patient that we will have to confirm benefits & coverage with their insurance company for a routine screening colonoscopy.

## (undated) DEVICE — MEDI-VAC YANKAUER SUCTION HANDLE: Brand: CARDINAL HEALTH

## (undated) DEVICE — AIRLIFE™  ADULT CUSHION NASAL CANNULA WITH 7 FOOT (2.1 M) CRUSH-RESISTANT OXYGEN TUBING, AND U/CONNECT-IT ADAPTER: Brand: AIRLIFE™

## (undated) DEVICE — 1200CC GUARDIAN II: Brand: GUARDIAN

## (undated) DEVICE — GAUZE SPONGES,16 PLY: Brand: CURITY

## (undated) DEVICE — TUBING AUX CHANNEL

## (undated) DEVICE — SINGLE-USE POLYPECTOMY SNARE: Brand: SENSATION SHORT THROW

## (undated) DEVICE — DISPOSABLE BIOPSY VALVE MAJ-1555: Brand: SINGLE USE BIOPSY VALVE (STERILE)

## (undated) DEVICE — SINGLE-USE BIOPSY FORCEPS: Brand: RADIAL JAW 4

## (undated) DEVICE — TRAP POLY

## (undated) DEVICE — "MAJ-901 WATER CONTAINER SET CV-160/140": Brand: WATER CONTAINER

## (undated) DEVICE — BRUSH ENDO CLEANING DBL-HEADER

## (undated) DEVICE — SOLIDIFIER FLUID WASTE CONTROL 1500ML

## (undated) DEVICE — TUBING BUBBLE CLEAR 5MM X 100 FT NS

## (undated) DEVICE — LUBRICANT SURGILUBE TUBE 4 OZ  FLIP TOP

## (undated) DEVICE — GLOVE EXAM NON-STRL NTRL PLUS LRG PF

## (undated) DEVICE — "MH-438 A/W VLVE F/140 EVIS-140": Brand: AIR/WATER VALVE